# Patient Record
Sex: MALE | Race: WHITE | URBAN - METROPOLITAN AREA
[De-identification: names, ages, dates, MRNs, and addresses within clinical notes are randomized per-mention and may not be internally consistent; named-entity substitution may affect disease eponyms.]

---

## 2022-11-30 ENCOUNTER — OFFICE VISIT (OUTPATIENT)
Dept: URBAN - METROPOLITAN AREA CLINIC 33 | Facility: CLINIC | Age: 32
End: 2022-11-30
Payer: COMMERCIAL

## 2022-11-30 DIAGNOSIS — H00.024 HORDEOLUM INTERNUM LEFT UPPER EYELID: Primary | ICD-10-CM

## 2022-11-30 DIAGNOSIS — H04.123 DRY EYE SYNDROME OF BILATERAL LACRIMAL GLANDS: ICD-10-CM

## 2022-11-30 PROCEDURE — 92002 INTRM OPH EXAM NEW PATIENT: CPT

## 2022-11-30 RX ORDER — CEPHALEXIN 500 MG/1
500 MG CAPSULE ORAL
Qty: 14 | Refills: 0 | Status: ACTIVE
Start: 2022-11-30

## 2022-11-30 ASSESSMENT — INTRAOCULAR PRESSURE
OS: 18
OD: 18

## 2022-11-30 NOTE — IMPRESSION/PLAN
Impression: Hordeolum internum left upper eyelid: H00.024. Plan: Patient educated on finding. Recommend patient to take keflex BID for 7 days, use artificial tears QID, and perform warm compresses QID with digital massage. Return to clinic if patient notes condition getting worse or painful. Continue to monitor.

## 2022-12-06 ENCOUNTER — OFFICE VISIT (OUTPATIENT)
Dept: URBAN - METROPOLITAN AREA CLINIC 33 | Facility: CLINIC | Age: 32
End: 2022-12-06
Payer: COMMERCIAL

## 2022-12-06 DIAGNOSIS — H00.024 HORDEOLUM INTERNUM LEFT UPPER EYELID: Primary | ICD-10-CM

## 2022-12-06 PROCEDURE — 92012 INTRM OPH EXAM EST PATIENT: CPT

## 2022-12-06 RX ORDER — ERYTHROMYCIN 5 MG/G
OINTMENT OPHTHALMIC
Qty: 5 | Refills: 1 | Status: INACTIVE
Start: 2022-12-06 | End: 2022-12-06

## 2022-12-06 RX ORDER — ERYTHROMYCIN 5 MG/G
OINTMENT OPHTHALMIC
Qty: 5 | Refills: 1 | Status: ACTIVE
Start: 2022-12-06

## 2022-12-06 ASSESSMENT — INTRAOCULAR PRESSURE
OD: 17
OS: 17

## 2022-12-06 NOTE — IMPRESSION/PLAN
Impression: Hordeolum internum left upper eyelid: H00.024. Plan: Patient educated on finding. Discussed with patient Megan Nab may remain after fully healed. Will wait for hordeolum to completely resolve. If mass still remain and is bothersome will have patient consult with oculoplastics. Continue with course of antibiotics, using artificial tears, and warm compresses. Will start erythomycin ointment for 7 days QHS and then discontinue. Return to clinic if hordeolum gets bigger or becomes painful.

## 2023-01-19 ENCOUNTER — OFFICE VISIT (OUTPATIENT)
Dept: URBAN - METROPOLITAN AREA CLINIC 33 | Facility: CLINIC | Age: 33
End: 2023-01-19
Payer: COMMERCIAL

## 2023-01-19 DIAGNOSIS — H00.14 CHALAZION LEFT UPPER EYELID: Primary | ICD-10-CM

## 2023-01-19 PROCEDURE — 99203 OFFICE O/P NEW LOW 30 MIN: CPT | Performed by: OPHTHALMOLOGY

## 2023-01-19 ASSESSMENT — INTRAOCULAR PRESSURE
OD: 15
OS: 15

## 2023-01-19 NOTE — IMPRESSION/PLAN
Impression: Chalazion left upper eyelid: H00.14. Plan: Patient examined. Chart reviewed. Patient has signs, symptoms and findings consistent with chronic chalazia. This was diagrammatically described. Patient voiced understanding. Discussed known options for management (do nothing, conservative management, steroid injection where appropriate,  and/or surgical intervention.) Risks discussed: bleeding, infection, dry eye, asymmetry, numbness, and/or need for further surgery. Patient elects surgical intervention at this time.  

RV 1 week for Office Procedure (91518 E1 - Excision of chalazion)

## 2023-01-25 ENCOUNTER — PROCEDURE (OUTPATIENT)
Dept: URBAN - METROPOLITAN AREA CLINIC 33 | Facility: CLINIC | Age: 33
End: 2023-01-25
Payer: COMMERCIAL

## 2023-01-25 DIAGNOSIS — H00.14 CHALAZION LEFT UPPER EYELID: Primary | ICD-10-CM

## 2023-01-25 PROCEDURE — 67800 REMOVE EYELID LESION: CPT | Performed by: OPHTHALMOLOGY

## 2023-01-25 RX ORDER — NEOMYCIN SULFATE, POLYMYXIN B SULFATE AND DEXAMETHASONE 3.5; 10000; 1 MG/ML; [USP'U]/ML; MG/ML
SUSPENSION/ DROPS OPHTHALMIC
Qty: 5 | Refills: 0 | Status: INACTIVE
Start: 2023-01-25 | End: 2023-02-03

## 2023-01-25 ASSESSMENT — INTRAOCULAR PRESSURE
OD: 16
OS: 17

## 2023-01-25 NOTE — IMPRESSION/PLAN
Impression: Chalazion left upper eyelid: H00.14. Plan: Patient examined. Chart reviewed. Patient has signs, symptoms and findings consistent with chronic chalazia. This was diagrammatically described. Patient voiced understanding. Discussed known options for management (do nothing, conservative management, steroid injection where appropriate,  and/or surgical intervention.) Risks discussed: bleeding, infection, dry eye, asymmetry, numbness, and/or need for further surgery. Patient elects surgical intervention at this time.  

(96309 E1 - Excision of chalazion)